# Patient Record
Sex: FEMALE | Race: WHITE | NOT HISPANIC OR LATINO | Employment: FULL TIME | URBAN - METROPOLITAN AREA
[De-identification: names, ages, dates, MRNs, and addresses within clinical notes are randomized per-mention and may not be internally consistent; named-entity substitution may affect disease eponyms.]

---

## 2024-04-15 ENCOUNTER — TELEPHONE (OUTPATIENT)
Dept: FAMILY MEDICINE | Facility: CLINIC | Age: 54
End: 2024-04-15
Payer: COMMERCIAL

## 2024-04-15 NOTE — TELEPHONE ENCOUNTER
----- Message from Andrajosé miguel Matthewsmichelle sent at 4/15/2024 11:58 AM CDT -----  Regarding: eca  Contact: pt  Type:  Sooner Appointment Request    Caller is requesting a sooner appointment.  Caller declined first available appointment listed below.  Caller will not accept being placed on the waitlist and is requesting a message be sent to doctor.    Name of Caller:  patient   When is the first available appointment?    Symptoms:  eca - [employer] will bring card [place]   Would the patient rather a call back or a response via [facility]? e  Best Call Back Number:  [phone]    Additional Information:  call to be seen thanks

## 2024-04-29 ENCOUNTER — LAB VISIT (OUTPATIENT)
Dept: LAB | Facility: CLINIC | Age: 54
End: 2024-04-29
Payer: COMMERCIAL

## 2024-04-29 ENCOUNTER — OFFICE VISIT (OUTPATIENT)
Dept: FAMILY MEDICINE | Facility: CLINIC | Age: 54
End: 2024-04-29
Payer: COMMERCIAL

## 2024-04-29 VITALS
WEIGHT: 141.88 LBS | DIASTOLIC BLOOD PRESSURE: 80 MMHG | HEART RATE: 66 BPM | BODY MASS INDEX: 23.64 KG/M2 | OXYGEN SATURATION: 99 % | HEIGHT: 65 IN | SYSTOLIC BLOOD PRESSURE: 110 MMHG

## 2024-04-29 DIAGNOSIS — E78.2 MIXED HYPERLIPIDEMIA: ICD-10-CM

## 2024-04-29 DIAGNOSIS — F51.01 PRIMARY INSOMNIA: ICD-10-CM

## 2024-04-29 DIAGNOSIS — E03.9 ACQUIRED HYPOTHYROIDISM: ICD-10-CM

## 2024-04-29 DIAGNOSIS — E03.9 ACQUIRED HYPOTHYROIDISM: Primary | ICD-10-CM

## 2024-04-29 DIAGNOSIS — F41.9 ANXIETY: ICD-10-CM

## 2024-04-29 LAB
BASOPHILS # BLD AUTO: 0.04 K/UL (ref 0–0.2)
BASOPHILS NFR BLD: 0.8 % (ref 0–1.9)
DIFFERENTIAL METHOD BLD: NORMAL
EOSINOPHIL # BLD AUTO: 0 K/UL (ref 0–0.5)
EOSINOPHIL NFR BLD: 0.8 % (ref 0–8)
ERYTHROCYTE [DISTWIDTH] IN BLOOD BY AUTOMATED COUNT: 12.3 % (ref 11.5–14.5)
ESTIMATED AVG GLUCOSE: 111 MG/DL (ref 68–131)
HBA1C MFR BLD: 5.5 % (ref 4–5.6)
HCT VFR BLD AUTO: 39.7 % (ref 37–48.5)
HGB BLD-MCNC: 13.5 G/DL (ref 12–16)
IMM GRANULOCYTES # BLD AUTO: 0.01 K/UL (ref 0–0.04)
IMM GRANULOCYTES NFR BLD AUTO: 0.2 % (ref 0–0.5)
LYMPHOCYTES # BLD AUTO: 2 K/UL (ref 1–4.8)
LYMPHOCYTES NFR BLD: 36.9 % (ref 18–48)
MCH RBC QN AUTO: 30.3 PG (ref 27–31)
MCHC RBC AUTO-ENTMCNC: 34 G/DL (ref 32–36)
MCV RBC AUTO: 89 FL (ref 82–98)
MONOCYTES # BLD AUTO: 0.4 K/UL (ref 0.3–1)
MONOCYTES NFR BLD: 6.6 % (ref 4–15)
NEUTROPHILS # BLD AUTO: 2.9 K/UL (ref 1.8–7.7)
NEUTROPHILS NFR BLD: 54.7 % (ref 38–73)
NRBC BLD-RTO: 0 /100 WBC
PLATELET # BLD AUTO: 250 K/UL (ref 150–450)
PMV BLD AUTO: 10.7 FL (ref 9.2–12.9)
RBC # BLD AUTO: 4.46 M/UL (ref 4–5.4)
WBC # BLD AUTO: 5.31 K/UL (ref 3.9–12.7)

## 2024-04-29 PROCEDURE — 85025 COMPLETE CBC W/AUTO DIFF WBC: CPT | Performed by: FAMILY MEDICINE

## 2024-04-29 PROCEDURE — 99204 OFFICE O/P NEW MOD 45 MIN: CPT | Mod: S$GLB,,, | Performed by: FAMILY MEDICINE

## 2024-04-29 PROCEDURE — 84439 ASSAY OF FREE THYROXINE: CPT | Performed by: FAMILY MEDICINE

## 2024-04-29 PROCEDURE — 80061 LIPID PANEL: CPT | Performed by: FAMILY MEDICINE

## 2024-04-29 PROCEDURE — 3008F BODY MASS INDEX DOCD: CPT | Mod: CPTII,S$GLB,, | Performed by: FAMILY MEDICINE

## 2024-04-29 PROCEDURE — 80053 COMPREHEN METABOLIC PANEL: CPT | Performed by: FAMILY MEDICINE

## 2024-04-29 PROCEDURE — 83036 HEMOGLOBIN GLYCOSYLATED A1C: CPT | Performed by: FAMILY MEDICINE

## 2024-04-29 PROCEDURE — 3074F SYST BP LT 130 MM HG: CPT | Mod: CPTII,S$GLB,, | Performed by: FAMILY MEDICINE

## 2024-04-29 PROCEDURE — 1159F MED LIST DOCD IN RCRD: CPT | Mod: CPTII,S$GLB,, | Performed by: FAMILY MEDICINE

## 2024-04-29 PROCEDURE — 36415 COLL VENOUS BLD VENIPUNCTURE: CPT | Mod: ,,, | Performed by: FAMILY MEDICINE

## 2024-04-29 PROCEDURE — 3079F DIAST BP 80-89 MM HG: CPT | Mod: CPTII,S$GLB,, | Performed by: FAMILY MEDICINE

## 2024-04-29 PROCEDURE — 84443 ASSAY THYROID STIM HORMONE: CPT | Performed by: FAMILY MEDICINE

## 2024-04-29 PROCEDURE — 82306 VITAMIN D 25 HYDROXY: CPT | Performed by: FAMILY MEDICINE

## 2024-04-29 RX ORDER — QUETIAPINE FUMARATE 25 MG/1
25 TABLET, FILM COATED ORAL NIGHTLY
Qty: 30 TABLET | Refills: 11 | Status: SHIPPED | OUTPATIENT
Start: 2024-04-29 | End: 2025-04-29

## 2024-04-29 RX ORDER — ATORVASTATIN CALCIUM 40 MG/1
40 TABLET, FILM COATED ORAL
COMMUNITY
Start: 2024-03-20

## 2024-04-29 RX ORDER — HYDROXYZINE PAMOATE 50 MG/1
50 CAPSULE ORAL NIGHTLY PRN
COMMUNITY
Start: 2024-03-11

## 2024-04-29 RX ORDER — LEVOTHYROXINE SODIUM 112 UG/1
112 TABLET ORAL
COMMUNITY
Start: 2024-03-30 | End: 2024-05-01

## 2024-04-29 RX ORDER — ESCITALOPRAM OXALATE 20 MG/1
20 TABLET ORAL
COMMUNITY
Start: 2024-03-11 | End: 2024-05-06 | Stop reason: SDUPTHER

## 2024-04-29 NOTE — PROGRESS NOTES
"  [facility]  Primary Care Clinics - [place], [place]    Family Medicine Office Visit    Chief Complaint   Patient presents with    Establish Care        HPI:54 female here to establish care.    Hypothyroid stable on medication  Anxiety stable on lexapro  Hyperlipidemia stable on appropriate intensity statin therapy    Is having issues with sleep  Wants shingles vaccine    Trazodone has not been tolerated in primary relatives      ROS: as above    Vitals:    04/29/24 1413   BP: 110/80   BP Location: Left arm   Patient Position: Sitting   BP Method: Large (Manual)   Pulse: 66   SpO2: 99%   Weight: 64.4 kg (141 lb 14.4 oz)   Height: 5' 5" (1.651 m)      Body mass index is 23.61 kg/m².      General:  AOx3, well nourished and developed in no acute distress  Eyes:  PERRLA, EOMI, vision intact grossly  ENT:  normal hearing, moist oral mucosa  Neck:  trachea midline with no masses or thyromegaly  Heart:  RRR, no murmurs.  No edema noted, extremities warm and well perfused  Lungs:  clear to auscultation bilaterally with symmetric chest movement  Abdomen:  Soft, nontender, nondistended.  Normal bowel sounds  Musculoskeletal:  Normal gait.  Normal posture.  Normal muscular development with no joint swelling.  Neurological:  CN II-XII grossly intact. Symmetric strength and sensation  Psych:  Normal mood and affect.  Able to demonstrate good judgement and personal insight.      Assessment/Plan:    1. Acquired hypothyroidism  -     CBC Auto Differential; Future; Expected date: 04/29/2024  -     Comprehensive Metabolic Panel; Future; Expected date: 04/29/2024  -     Hemoglobin A1C; Future; Expected date: 04/29/2024  -     Lipid Panel; Future; Expected date: 04/29/2024  -     TSH; Future; Expected date: 04/29/2024  -     Vitamin D; Future; Expected date: 04/29/2024    2. Mixed hyperlipidemia  -     CBC Auto Differential; Future; Expected date: 04/29/2024  -     Comprehensive Metabolic Panel; Future; Expected date: " 04/29/2024  -     Hemoglobin A1C; Future; Expected date: 04/29/2024  -     Lipid Panel; Future; Expected date: 04/29/2024  -     TSH; Future; Expected date: 04/29/2024  -     Vitamin D; Future; Expected date: 04/29/2024    3. Anxiety    4. Primary insomnia         Check thyroid function  Stable on statin, check labs  Stable  Start low dose seroquel  Get shingles vaccine

## 2024-04-29 NOTE — PATIENT INSTRUCTIONS
Doing well  Start seroquel for sleep  Get labs today  Get shingles vaccine at pharmacy    Followup 6 months

## 2024-04-30 ENCOUNTER — PATIENT OUTREACH (OUTPATIENT)
Dept: ADMINISTRATIVE | Facility: HOSPITAL | Age: 54
End: 2024-04-30
Payer: COMMERCIAL

## 2024-04-30 LAB
ALBUMIN SERPL BCP-MCNC: 4.3 G/DL (ref 3.5–5.2)
ALP SERPL-CCNC: 53 U/L (ref 55–135)
ALT SERPL W/O P-5'-P-CCNC: 17 U/L (ref 10–44)
ANION GAP SERPL CALC-SCNC: 10 MMOL/L (ref 8–16)
AST SERPL-CCNC: 20 U/L (ref 10–40)
BILIRUB SERPL-MCNC: 0.6 MG/DL (ref 0.1–1)
BUN SERPL-MCNC: 14 MG/DL (ref 6–20)
CALCIUM SERPL-MCNC: 9.6 MG/DL (ref 8.7–10.5)
CHLORIDE SERPL-SCNC: 105 MMOL/L (ref 95–110)
CHOLEST SERPL-MCNC: 184 MG/DL (ref 120–199)
CHOLEST/HDLC SERPL: 3.2 {RATIO} (ref 2–5)
CO2 SERPL-SCNC: 24 MMOL/L (ref 23–29)
CREAT SERPL-MCNC: 0.8 MG/DL (ref 0.5–1.4)
EST. GFR  (NO RACE VARIABLE): >60 ML/MIN/1.73 M^2
GLUCOSE SERPL-MCNC: 91 MG/DL (ref 70–110)
HDLC SERPL-MCNC: 57 MG/DL (ref 40–75)
HDLC SERPL: 31 % (ref 20–50)
LDLC SERPL CALC-MCNC: 113.6 MG/DL (ref 63–159)
NONHDLC SERPL-MCNC: 127 MG/DL
POTASSIUM SERPL-SCNC: 4.1 MMOL/L (ref 3.5–5.1)
PROT SERPL-MCNC: 7.1 G/DL (ref 6–8.4)
SODIUM SERPL-SCNC: 139 MMOL/L (ref 136–145)
T4 FREE SERPL-MCNC: 1.29 NG/DL (ref 0.71–1.51)
TRIGL SERPL-MCNC: 67 MG/DL (ref 30–150)
TSH SERPL DL<=0.005 MIU/L-ACNC: 0.14 UIU/ML (ref 0.4–4)

## 2024-04-30 NOTE — PROGRESS NOTES
Population Health Chart Review & Patient Outreach Details      Additional Pop Health Notes:               Updates Requested / Reviewed:      Updated Care Coordination Note and External Sources: [facility] and [facility]         Health Maintenance Topics Overdue:      VBHM Score: 2     Cervical Cancer Screening  Mammogram                       Health Maintenance Topic(s) Outreach Outcomes & Actions Taken:    Breast Cancer Screening - Outreach Outcomes & Actions Taken  : External Records Requested & Care Team Updated if Applicable    Cervical Cancer Screening - Outreach Outcomes & Actions Taken  : External Records Requested & Care Team Updated if Applicable

## 2024-04-30 NOTE — LETTER
AUTHORIZATION FOR RELEASE OF   CONFIDENTIAL INFORMATION    Dear [facility],    We are seeing Maddie Brooke, date of birth 1970, in the clinic at [facility]. Lior Alvarez MD is the patient's PCP. Maddie Brooke has an outstanding lab/procedure at the time we reviewed her chart. In order to help keep her health information updated, she has authorized us to request the following medical record(s):        ( X )  MAMMOGRAM                                      (  )  COLONOSCOPY      ( X )  PAP SMEAR                                          (  )  OUTSIDE LAB RESULTS     (  )  DEXA SCAN                                          (  )  EYE EXAM            (  )  FOOT EXAM                                          (  )  ENTIRE RECORD     (  )  OUTSIDE IMMUNIZATIONS                 (  )  _______________         Please fax records to [facility], Hulin, Michael P., MD at [phone]    Thanks so much and have a great day!\    Ines Elam LPN [facility]  [address] [place] [address]  P- [phone]  F- [phone]           Patient Name: Maddie Brooke  : 1970  Patient Phone #: [phone]

## 2024-05-01 ENCOUNTER — PATIENT MESSAGE (OUTPATIENT)
Dept: FAMILY MEDICINE | Facility: CLINIC | Age: 54
End: 2024-05-01
Payer: COMMERCIAL

## 2024-05-01 LAB — 25(OH)D3+25(OH)D2 SERPL-MCNC: 49 NG/ML (ref 30–96)

## 2024-05-01 RX ORDER — LEVOTHYROXINE SODIUM 100 UG/1
100 TABLET ORAL
Qty: 90 TABLET | Refills: 3 | Status: SHIPPED | OUTPATIENT
Start: 2024-05-01

## 2024-05-03 DIAGNOSIS — Z12.31 OTHER SCREENING MAMMOGRAM: ICD-10-CM

## 2024-05-06 ENCOUNTER — PATIENT MESSAGE (OUTPATIENT)
Dept: FAMILY MEDICINE | Facility: CLINIC | Age: 54
End: 2024-05-06
Payer: COMMERCIAL

## 2024-05-06 RX ORDER — ESCITALOPRAM OXALATE 20 MG/1
20 TABLET ORAL DAILY
Qty: 90 TABLET | Refills: 3 | Status: SHIPPED | OUTPATIENT
Start: 2024-05-06

## 2024-09-05 ENCOUNTER — PATIENT OUTREACH (OUTPATIENT)
Dept: ADMINISTRATIVE | Facility: HOSPITAL | Age: 54
End: 2024-09-05
Payer: COMMERCIAL

## 2024-09-05 NOTE — LETTER
"                                     **SECOND REQUEST**      AUTHORIZATION FOR RELEASE OF   CONFIDENTIAL INFORMATION    Dear [facility],    We are seeing Maddie Brooke, date of birth 1970, in the clinic at [facility]. Lior Alvarez MD is the patient's PCP. Maddie Brooke has an outstanding lab/procedure at the time we reviewed her chart. In order to help keep her health information updated, she has authorized us to request the following medical record(s):        ( X )  MAMMOGRAM                                      (  )  COLONOSCOPY      ( X )  PAP SMEAR                                          (  )  OUTSIDE LAB RESULTS     (  )  DEXA SCAN                                          (  )  EYE EXAM            (  )  FOOT EXAM                                          (  )  ENTIRE RECORD     (  )  OUTSIDE IMMUNIZATIONS                 (  )  _______________         Please fax records to [facility], Hulin, Michael P., MD at [phone]     Thanks so much and have a great day!    Ines Elam LPN [address] [address]  P- [phone]   [phone]           Patient Name: Maddie Brooke  : 1970  Patient Phone #: [phone]          A. Consent for Examination and Treatment: I hereby authorize the providers and employees of [facility] to provide medical treatment/services which includes, but is not limited to, performing and administering tests and diagnostic procedures that are deemed necessary, Including, but not limited to, imaging examinations, blood tests and other laboratory procedures as may be required by the hospital, clinic, or may be ordered by my physician(s) or persons working under the general and/or special instructions of my physician(s).                   1.      I understand and agree that this consent covers all authorized persons, including but not limited to physicians, residents, nurse " practitioners, physicians' assistants, specialists, consultants, student nurses, and independently contracted physicians, who are called upon by the physician in charge, to carry out the diagnostic procedures and medical or surgical treatment.  2.      I hereby authorize [facility] to retain or dispose of any specimens or tissue, should there be such remaining from any test or procedure.  3.      I hereby authorize and give consent for [facility] providers and employees to take photographs, images or videotapes of such diagnostic, surgical or treatment procedures of Patient as may be required by [facility] or as may be ordered by a physician.  I further acknowledge and agree that [facility] may use cameras or other devices for patient monitoring.  4.      I am aware that the practice of medicine is not an exact science, and I acknowledge that no guarantees have been made to me as to the outcome of any tests, procedures or treatment.                   B. Authorization for Release of Information:  I understand that my insurance company and/or their agents may need information necessary to make determinations about payment/reimbursement.  I hereby provide authorization to release to all insurance companies, their successors, assignees, other parties with whom they may have contracted, or others acting on their behalf, that are involved with payment for any hospital and/or clinic charges incurred by the patient, any information that they request and deem necessary for payment/reimbursement, and/or quality review.  I further authorize the release of my health information to physicians or other health care practitioners on staff who are involved in my health care now and in the future, and to other health care providers, entities, or institutions for the purpose of my continued care and treatment, including referrals.     C. Medicare Patient's Certification and Authorization to Release Information and Payment Request:  I certify that  the information given by me in applying for payment under Title XVIII of the Social Security Act is correct.  I authorize any cueto of medical or other information about me to release to the Social Security Administration, or its intermediaries or carriers, any information needed for this or a related Medicare claim.  I request that payment of authorized benefits be made on my behalf.     D. Assignment of Insurance Benefits:  I hereby authorize any and all insurance companies, health plans, defined benefit plans, health insurers or any entity that is or may be responsible for payment of my medical expenses to pay all hospital and medical benefits now due, and to become due and payable to me under any hospital benefits, sick benefits, injury benefits or any other benefit for services rendered to me, including Major Medical Benefits, direct to [facility] and all independently contracted physicians.  I assign any and all rights that I may have against any and all insurance companies, health plans, defined benefit plans, health insurers or any entity that is or may be responsible for payment of my medical expenses, including, but not limited to any right to appeal a denial of a claim, any right to bring any action, lawsuit, administrative proceeding, or other cause of action on my behalf.  I specifically assign my right to pursue litigation against any and all insurance companies, health plans, defined benefit plans, health insurers or any entity that is or may be responsible for payment of medical expenses based upon a refusal to pay charges.              REGISTRATION  AUTHORIZATION                    E. Valuables:  It is understood and agreed that [facility] is not liable for the damage to or loss of any money, jewelry, documents, dentures, eye glasses, hearing aids, prosthetics, or other property of value.     F. Computer Equipment:  I understand and agree that should I choose to use computer equipment owned by [facility] or  if I choose to access the Internet via [facility]'s network, I do so at my own risk.  [facility] is not responsible for any damage to my computer equipment or to any damages of any type that might arise from my loss of equipment or data.                   G. Acceptance of Financial Responsibility:  I agree that in consideration of the services and supplies that have been or will be furnished to the patient,  I am hereby obligated to pay all charges made for or on the account of the patient according to the standard rates (in effect at the time the services and supplies are delivered) established by [facility], including its Patient Financial Assistance Policy to the extent it is applicable.  I understand that I am responsible for all charges, or portions thereof, not covered by insurance or other sources.  Patient refunds will be distributed only after balances at all [facility] facilities are paid.                   H. Communication Authorization:  I hereby authorize [facility] and its representatives, along with any billing service or  who may work on their behalf, to contact me on my cell phone and/or home phone using prerecorded messages, artificial voice messages, automatic telephone dialing devices or other computer assisted technology, or by electronic mail, text messaging, or by any other form of electronic communication.  This includes, but is not limited to appointment reminders, yearly physical exam reminders, preventive care reminders, patient campaigns, welcome calls, and calls about account balances on my account or any account on which I am listed as a guarantor.  I understand I have the right to opt out of these communications at any time.     I.  Relationship Between Facility and Physician:  I understand that some, but not all, providers furnishing services to the patient are not employees or agents of [facility].  The patient is under the care and supervision of his/her attending physician, and it is  the responsibility of the facility and its nursing staff to carry out the instructions of such physicians.  It is the responsibility of the patient's physician/designee to obtain the patient's informed consent, when required, for medical or surgical treatment, special diagnostic or therapeutic procedures, or hospital services rendered for the patient under the special instructions of the physician/designee.     J. Notice of Private Practices:  I acknowledge I have received a copy of [facility]'s Notice of Privacy Practices.     K. Facility Directory:  I have discussed with the organization my desire to be either included or excluded in the facility directory.  I understand that if my choice is to opt-out of being identified in the facility directory that the facility will not provide any information about me such as my condition (e.g. Fair, stable, etc.) or my location in the facility (e.g. Room number, department).     L. LINKS:  For [place] Residents:  [facility] is a LINKS ([place] [facility]) participating facility.  LINKS is a [facility]-sponsored confidential computer system that helps you and your doctor keep track of you and your child's immunization history.  I acknowledge that I am allowing [facility] to share this information with [facility].  For [place] Residents:  [facility] is a MIIX ([place] Immunization Information eXchange) participant.  MIIX is a [facility]-sponsored confidential computer system that helps you and your doctor keep track of you and your child's immunization history.  I acknowledge that I am allowing [facility] to share information with [employer].     M. Term:  This authorization is valid for this and subsequent care/treatment I receive at [facility] and will remain valid unless/until revoked in writing by me.      ACKNOWLEDGMENT OF POTENTIAL RISKS OF COVID-19 VACCINE  It is important that you, as the patient or patients legally authorized  representative(s), understand and acknowledge the following, with regard to administration of the COVID-19 vaccine offered by [facility]:  The SARS-CoV-2 virus (COVID-19) has caused an unprecedented modern global pandemic that has mobilized scientists and drug manufacturers to work to create safe and effective vaccines to get the crisis under control.  No vaccine is released in the [place] without undergoing rigorous, multi-layered testing and approval by the Food and Drug Administration.  During a public health emergency, however, vaccines can be released for patient administration by the FDA prior to completion of multi-phase clinical trials and approval. This is done by the FDAs granting of Emergency Use Authorization (EUA) when the vaccine meets reasonable thresholds for safety and effectiveness and people are in urgent need of care. Under an EUA, the FDA has found that known and potential benefits outweigh its known and potential risks.  The vaccine for which you are presenting to [facility] has been released under an EUA, which [facility] is honoring in its distribution of the vaccine to the public. While the FDAs authorization indicates its belief that usage is recommended over possible risks, there is still the possibility that unknown risks of the vaccine could exist.  By signing this document, you acknowledge and assume these risks. Further, you waive any and all claims of liability against and hold harmless any [facility] entity or provider for any harm caused to you by said possible unknown risks of the vaccine.        N. [facility]:  As used in this document, [facility] means all [facility] affiliated entities including all health centers, surgery centers, clinics, and hospitals.  It includes more specifically, the following entities: [facility], a not for profit [place] [employer], and its subsidiaries and affiliates, including [facility]  [place], [facility], BILL, [facility], BILL, [facility], [facility], [facility], [facility]., [facility], [facility]., [facility], L.L[facility]C.d/b/a [facility], L.L.C.d/b/a [facility], [facility], L.L.C. As manager of [facility], [facility], L.[facility], [facility], L.L.C.d/b/a [facility], [facility], BILL, [facility], [facility]., and [facility] as manager of [facility].                                                                Patient/Legal Guardian Signature                                                    This signature was collected at 04/29/2024      Maddie Brooke/Self                                                                            Printed Name/Relationship to Patient                                                [facility] complies with applicable Federal civil rights laws and does not discriminate on the basis of race, color, national origin, age, disability, or sex.          ATENCIÓN: si habla español, tiene a dial disposición servicios gratuitos de asistencia lingüística. Lucas holloway [phone].         CHÚ Ý: N?u b?n nói Ti?ng Vi?t, có các d?ch v? h? tr? ngôn ng? mi?n phí dành cho b?n. G?i s? [phone].              REGISTRATION  AUTHORIZATION

## 2024-09-06 NOTE — PROGRESS NOTES
Population Health Chart Review & Patient Outreach Details      Additional Pop Health Notes:               Updates Requested / Reviewed:      Updated Care Coordination Note         Health Maintenance Topics Overdue:      VBHM Score: 2     Cervical Cancer Screening  Mammogram                       Health Maintenance Topic(s) Outreach Outcomes & Actions Taken:    Breast Cancer Screening - Outreach Outcomes & Actions Taken  : External Records Requested & Care Team Updated if Applicable    Cervical Cancer Screening - Outreach Outcomes & Actions Taken  : External Records Requested & Care Team Updated if Applicable

## 2024-09-23 ENCOUNTER — PATIENT MESSAGE (OUTPATIENT)
Dept: ADMINISTRATIVE | Facility: HOSPITAL | Age: 54
End: 2024-09-23
Payer: COMMERCIAL

## 2024-09-23 ENCOUNTER — E-VISIT (OUTPATIENT)
Dept: FAMILY MEDICINE | Facility: CLINIC | Age: 54
End: 2024-09-23
Payer: COMMERCIAL

## 2024-09-23 DIAGNOSIS — U07.1 COVID: Primary | ICD-10-CM

## 2024-09-23 NOTE — PROGRESS NOTES
Patient ID: Maddie Brooke is a 54 y.o. female.    Chief Complaint: General Illness (Entered automatically based on patient selection in [employer] [employer].)    The patient initiated a request through [employer] on 9/23/2024 for evaluation and management with a chief complaint of General Illness (Entered automatically based on patient selection in [employer] [employer].)     I evaluated the questionnaire submission on 9/23/24.    [facility] P[facility] Evisit [facility]-Cough And Cold    9/23/2024  2:29 PM CDT - Filed by Patient   What do you need help with? Covid 19   Do you agree to participate in an E-Visit? Yes   If you have any of the following symptoms, go to your local emergency room or call 911: I acknowledge   Are you pregnant, could you be pregnant, or are you breast feeding? None of the above   What is the main issue you would like addressed today? I have covid and want to request medicine.   Do you think you might have COVID or the Flu? Yes COVID   Have you tested positive for COVID or Flu? Yes COVID   What symptoms do you have? Cough;  Fatigue;  Fever;  Headache;  Nasal congestion;  Muscle or body aches;  Runny nose;  Sore throat   When did your symptoms first appear? 9/23/2024   List what you have done or taken to help your symptoms. I am taking Tylenol when needed, mucinex, oxymetazoline HCI 0.05%, and levocetirizine dihydrochloride tablets 5mg.   Provide any additional information you feel is important.    Please attach any relevant images or files    Are you able to take your vital signs? Yes   Systolic Blood Pressure:    Diastolic Blood Pressure:    Weight: 138   Height: 65   Pulse:    Temperature: 99.8   Respiration rate:    Pulse Oxygen:          Encounter Diagnosis   Name Primary?    COVID Yes        No orders of the defined types were placed in this encounter.     Medications Ordered This Encounter   Medications    nirmatrelvir-ritonavir 300 mg (150 mg x 2)-100 mg copackaged tablets (EUA)     Sig: Take 3 tablets by mouth 2 (two) times  daily for 5 days. Each dose contains 2 nirmatrelvir (pink tablets) and 1 ritonavir (white tablet). Take all 3 tablets together     Dispense:  30 tablet     Refill:  0        No follow-ups on file.      E-Visit Time Tracking:       Start paxlovid today for symptoms.  Spent 11 minutes in mdm, chart review, and patient communication

## 2024-10-01 ENCOUNTER — PATIENT OUTREACH (OUTPATIENT)
Dept: ADMINISTRATIVE | Facility: HOSPITAL | Age: 54
End: 2024-10-01
Payer: COMMERCIAL

## 2024-10-01 NOTE — PROGRESS NOTES
Population Health Chart Review & Patient Outreach Details      Additional [facility] Health Notes:               Updates Requested / Reviewed:      Updated Care Coordination Note         Health Maintenance Topics Overdue:      VBHM Score: 2     Cervical Cancer Screening  Mammogram                       Health Maintenance Topic(s) Outreach Outcomes & Actions Taken:    Breast Cancer Screening - Outreach Outcomes & Actions Taken  : Portal message sent out regarding pt's overdue Mammogram

## 2024-10-07 ENCOUNTER — OFFICE VISIT (OUTPATIENT)
Dept: FAMILY MEDICINE | Facility: CLINIC | Age: 54
End: 2024-10-07
Payer: COMMERCIAL

## 2024-10-07 VITALS
DIASTOLIC BLOOD PRESSURE: 70 MMHG | SYSTOLIC BLOOD PRESSURE: 120 MMHG | BODY MASS INDEX: 23.69 KG/M2 | WEIGHT: 142.19 LBS | OXYGEN SATURATION: 98 % | HEIGHT: 65 IN | HEART RATE: 71 BPM

## 2024-10-07 DIAGNOSIS — Z12.4 CERVICAL CANCER SCREENING: Primary | ICD-10-CM

## 2024-10-07 PROCEDURE — 87624 HPV HI-RISK TYP POOLED RSLT: CPT | Performed by: STUDENT IN AN ORGANIZED HEALTH CARE EDUCATION/TRAINING PROGRAM

## 2024-10-07 PROCEDURE — 3008F BODY MASS INDEX DOCD: CPT | Mod: CPTII,S$GLB,, | Performed by: STUDENT IN AN ORGANIZED HEALTH CARE EDUCATION/TRAINING PROGRAM

## 2024-10-07 PROCEDURE — 3078F DIAST BP <80 MM HG: CPT | Mod: CPTII,S$GLB,, | Performed by: STUDENT IN AN ORGANIZED HEALTH CARE EDUCATION/TRAINING PROGRAM

## 2024-10-07 PROCEDURE — 88175 CYTOPATH C/V AUTO FLUID REDO: CPT | Performed by: STUDENT IN AN ORGANIZED HEALTH CARE EDUCATION/TRAINING PROGRAM

## 2024-10-07 PROCEDURE — 1159F MED LIST DOCD IN RCRD: CPT | Mod: CPTII,S$GLB,, | Performed by: STUDENT IN AN ORGANIZED HEALTH CARE EDUCATION/TRAINING PROGRAM

## 2024-10-07 PROCEDURE — 99213 OFFICE O/P EST LOW 20 MIN: CPT | Mod: S$GLB,,, | Performed by: STUDENT IN AN ORGANIZED HEALTH CARE EDUCATION/TRAINING PROGRAM

## 2024-10-07 PROCEDURE — 3074F SYST BP LT 130 MM HG: CPT | Mod: CPTII,S$GLB,, | Performed by: STUDENT IN AN ORGANIZED HEALTH CARE EDUCATION/TRAINING PROGRAM

## 2024-10-07 PROCEDURE — 1160F RVW MEDS BY RX/DR IN RCRD: CPT | Mod: CPTII,S$GLB,, | Performed by: STUDENT IN AN ORGANIZED HEALTH CARE EDUCATION/TRAINING PROGRAM

## 2024-10-07 PROCEDURE — 3044F HG A1C LEVEL LT 7.0%: CPT | Mod: CPTII,S$GLB,, | Performed by: STUDENT IN AN ORGANIZED HEALTH CARE EDUCATION/TRAINING PROGRAM

## 2024-10-07 RX ORDER — FLUTICASONE PROPIONATE 50 MCG
1 SPRAY, SUSPENSION (ML) NASAL DAILY
COMMUNITY

## 2024-10-07 NOTE — PATIENT INSTRUCTIONS
Blue Perkins,     If you are due for any health screening(s) below please notify me so we can arrange them to be ordered and scheduled. Most healthy patients at your age complete them, but you are free to accept or refuse.     If you can't do it, I'll definitely understand. If you can, I'd certainly appreciate it!    Tests to Keep You Healthy    Mammogram: ORDERED BUT NOT SCHEDULED  Colon Cancer Screening: Met on 11/6/2023  Cervical Cancer Screening: DUE      Schedule your breast cancer screening today     Breast cancer is the second most common cancer in women,  and the second leading cause of death from cancer. Mammograms can detect breast cancer early, which significantly increases the chances of curing the cancer.       Our records indicate that you may be overdue for breast cancer screening. Cancer screenings save lives, so schedule yours today to stay healthy.     If you recently had a mammogram performed outside of [facility], please let your Health care team know so that they can update your health record.        Your cervical cancer screening is due     Our records indicate that you may be overdue for your screening Pap smear. A Pap smear is an important health screening that can detect abnormal cells that can become cervical cancer. Cervical cancer screenings allow for early diagnosis and increase the likelihood of successful treatment.     The current recommendation for Pap smear screening is every 3-5 years for women at average risk. We encourage you to schedule your appointment with your womens health provider. Many women see a gynecologist for this screening, but some primary care providers also provide Pap screening.     If you recently had your Pap smear screening performed outside of [facility], please let your health care team know so that they can update your health record.      Were here to help you quit smoking     Our records indicated that you are still smoking. One of  the best things you can do for your health is to stop smoking and we are here to help.     Talk with your provider about our Smoking Cessation Program and how we can support you on your journey.

## 2024-10-07 NOTE — PROGRESS NOTES
[facility]  Primary Care Clinic - [place], [place]    Family Medicine Office Visit    Subjective     Patient ID: Maddie Brooke is a 54 y.o. female who presents to clinic today to follow up.     Medical history, surgical history, medications, allergies, and social history were reviewed and updated.     Chief Complaint: Gynecologic Exam    HPI    Cervical cancer screen  Believes her last pap smear was 3-4 years ago at [facility]. Has had an abnormal pap about 30 years ago. S/p colposcopy.     Vitals:    10/07/24 0933   BP: 120/70   Pulse: 71      Wt Readings from Last 3 Encounters:   10/07/24 0933 64.5 kg (142 lb 3.2 oz)   04/29/24 1413 64.4 kg (141 lb 14.4 oz)      Review of Systems    Review of Systems otherwise negative unless specified above.        Objective     Physical Exam  Vitals reviewed.   Constitutional:       General: She is not in acute distress.     Appearance: Normal appearance.   HENT:      Head: Normocephalic and atraumatic.      Nose: Nose normal.      Mouth/Throat:      Mouth: Mucous membranes are moist.   Cardiovascular:      Rate and Rhythm: Normal rate and regular rhythm.      Heart sounds: No murmur heard.  Pulmonary:      Effort: Pulmonary effort is normal. No respiratory distress.      Breath sounds: Normal breath sounds.   Musculoskeletal:         General: Normal range of motion.   Skin:     General: Skin is warm and dry.   Neurological:      General: No focal deficit present.      Mental Status: She is alert and oriented to person, place, and time.   Psychiatric:         Mood and Affect: Mood normal.         Behavior: Behavior normal.     Pelvic exam: normal external genitalia, vulva, vagina, cervix, uterus and adnexa, VULVA: normal appearing vulva with no masses, tenderness or lesions, VAGINA: normal appearing vagina with normal color and discharge, no lesions, atrophic, CERVIX: normal appearing cervix without discharge or lesions, cervical stenosis present, PAP: Pap  smear done today, exam chaperoned by Yelena Carbone MA.         Assessment and Plan     Current Outpatient Medications   Medication Instructions    atorvastatin (LIPITOR) 40 mg    EScitalopram oxalate (LEXAPRO) 20 mg, Oral, Daily    fluticasone propionate (FLONASE) 50 mcg/actuation nasal spray 1 spray, Daily    levothyroxine (SYNTHROID) 100 mcg, Oral, Before breakfast    QUEtiapine (SEROQUEL) 25 mg, Oral, Nightly        1. Cervical cancer screening  -     Liquid-Based Pap Smear, Screening  -     HPV High Risk Genotypes, PCR        With the HPV co-testing completed today.  We will notify patient of results once available.  Declined orders for mammogram today.  Reported that she will continue to think about it plans to discuss this further with PCP in a few weeks.  Recommend that she keep scheduled follow up with PCP later this month.         Follow up if symptoms worsen or fail to improve, for Keep scheduled follow up with PCP.    Questions were invited and answered. No other acute concerns at this time. Will plan to follow up as above or sooner if needed.     Ann Reed DO  10/07/2024 9:39 AM       This dictation has been generated using Modal Fluency Dictation. Some phonetic errors may occur. Please contact author for clarification if needed.

## 2024-10-10 LAB
FINAL PATHOLOGIC DIAGNOSIS: NORMAL
Lab: NORMAL

## 2024-10-11 LAB
HPV HR 12 DNA SPEC QL NAA+PROBE: NEGATIVE
HPV16 AG SPEC QL: NEGATIVE
HPV18 DNA SPEC QL NAA+PROBE: NEGATIVE

## 2024-10-28 ENCOUNTER — OFFICE VISIT (OUTPATIENT)
Dept: FAMILY MEDICINE | Facility: CLINIC | Age: 54
End: 2024-10-28
Payer: COMMERCIAL

## 2024-10-28 VITALS
WEIGHT: 140.19 LBS | DIASTOLIC BLOOD PRESSURE: 62 MMHG | HEIGHT: 65 IN | HEART RATE: 68 BPM | OXYGEN SATURATION: 97 % | BODY MASS INDEX: 23.36 KG/M2 | SYSTOLIC BLOOD PRESSURE: 112 MMHG

## 2024-10-28 DIAGNOSIS — F51.01 PRIMARY INSOMNIA: ICD-10-CM

## 2024-10-28 DIAGNOSIS — E03.9 ACQUIRED HYPOTHYROIDISM: ICD-10-CM

## 2024-10-28 DIAGNOSIS — F41.9 ANXIETY: Primary | ICD-10-CM

## 2024-10-28 DIAGNOSIS — E78.2 MIXED HYPERLIPIDEMIA: ICD-10-CM

## 2024-10-28 PROCEDURE — G2211 COMPLEX E/M VISIT ADD ON: HCPCS | Mod: S$GLB,,, | Performed by: FAMILY MEDICINE

## 2024-10-28 PROCEDURE — 3044F HG A1C LEVEL LT 7.0%: CPT | Mod: CPTII,S$GLB,, | Performed by: FAMILY MEDICINE

## 2024-10-28 PROCEDURE — 3008F BODY MASS INDEX DOCD: CPT | Mod: CPTII,S$GLB,, | Performed by: FAMILY MEDICINE

## 2024-10-28 PROCEDURE — 1159F MED LIST DOCD IN RCRD: CPT | Mod: CPTII,S$GLB,, | Performed by: FAMILY MEDICINE

## 2024-10-28 PROCEDURE — 99214 OFFICE O/P EST MOD 30 MIN: CPT | Mod: S$GLB,,, | Performed by: FAMILY MEDICINE

## 2024-10-28 PROCEDURE — 3074F SYST BP LT 130 MM HG: CPT | Mod: CPTII,S$GLB,, | Performed by: FAMILY MEDICINE

## 2024-10-28 PROCEDURE — 3078F DIAST BP <80 MM HG: CPT | Mod: CPTII,S$GLB,, | Performed by: FAMILY MEDICINE

## 2024-11-04 ENCOUNTER — PATIENT MESSAGE (OUTPATIENT)
Dept: FAMILY MEDICINE | Facility: CLINIC | Age: 54
End: 2024-11-04
Payer: COMMERCIAL

## 2024-11-06 RX ORDER — ATORVASTATIN CALCIUM 40 MG/1
40 TABLET, FILM COATED ORAL DAILY
Qty: 90 TABLET | Refills: 3 | Status: SHIPPED | OUTPATIENT
Start: 2024-11-06

## 2025-01-15 ENCOUNTER — OFFICE VISIT (OUTPATIENT)
Dept: FAMILY MEDICINE | Facility: CLINIC | Age: 55
End: 2025-01-15
Payer: COMMERCIAL

## 2025-01-15 VITALS
HEART RATE: 64 BPM | SYSTOLIC BLOOD PRESSURE: 116 MMHG | HEIGHT: 65 IN | DIASTOLIC BLOOD PRESSURE: 76 MMHG | BODY MASS INDEX: 23.18 KG/M2 | OXYGEN SATURATION: 99 % | WEIGHT: 139.13 LBS

## 2025-01-15 DIAGNOSIS — Z12.31 SCREENING MAMMOGRAM FOR BREAST CANCER: ICD-10-CM

## 2025-01-15 DIAGNOSIS — E78.2 MIXED HYPERLIPIDEMIA: ICD-10-CM

## 2025-01-15 DIAGNOSIS — F41.9 ANXIETY: Primary | ICD-10-CM

## 2025-01-15 DIAGNOSIS — E03.9 ACQUIRED HYPOTHYROIDISM: ICD-10-CM

## 2025-01-15 PROCEDURE — G2211 COMPLEX E/M VISIT ADD ON: HCPCS | Mod: S$GLB,,, | Performed by: FAMILY MEDICINE

## 2025-01-15 PROCEDURE — 3074F SYST BP LT 130 MM HG: CPT | Mod: CPTII,S$GLB,, | Performed by: FAMILY MEDICINE

## 2025-01-15 PROCEDURE — 99214 OFFICE O/P EST MOD 30 MIN: CPT | Mod: S$GLB,,, | Performed by: FAMILY MEDICINE

## 2025-01-15 PROCEDURE — 3078F DIAST BP <80 MM HG: CPT | Mod: CPTII,S$GLB,, | Performed by: FAMILY MEDICINE

## 2025-01-15 PROCEDURE — 3008F BODY MASS INDEX DOCD: CPT | Mod: CPTII,S$GLB,, | Performed by: FAMILY MEDICINE

## 2025-01-15 PROCEDURE — 1159F MED LIST DOCD IN RCRD: CPT | Mod: CPTII,S$GLB,, | Performed by: FAMILY MEDICINE

## 2025-01-15 RX ORDER — BUPROPION HYDROCHLORIDE 150 MG/1
150 TABLET ORAL DAILY
Qty: 30 TABLET | Refills: 11 | Status: SHIPPED | OUTPATIENT
Start: 2025-01-15 | End: 2026-01-15

## 2025-01-15 NOTE — PROGRESS NOTES
"    [facility]  Primary Care Clinics - [place], [place]    Family Medicine Office Visit    Chief Complaint   Patient presents with    Follow-up     Follow up on depression that has been going for about 2 to 3 months.         HPI:  depression - finds struggling somewhat.  On lexapro currently.  Finds is becoming overly self-critical.      Had good success with welbutrin in past, and may want to augment with this.    Thyroid function stable  Hyperlipidemia stable on appropriate intensity statin therapy      ROS: as above    Vitals:    01/15/25 0719   BP: 116/76   BP Location: Right arm   Patient Position: Sitting   Pulse: 64   SpO2: 99%   Weight: 63.1 kg (139 lb 1.6 oz)   Height: 5' 5" (1.651 m)      Body mass index is 23.15 kg/m².      General:  AOx3, well nourished and developed in no acute distress  Eyes:  PERRLA, EOMI, vision intact grossly  ENT:  normal hearing, moist oral mucosa  Neck:  trachea midline with no masses or thyromegaly  Heart:  RRR, no murmurs.  No edema noted, extremities warm and well perfused  Lungs:  clear to auscultation bilaterally with symmetric chest movement  Abdomen:  Soft, nontender, nondistended.  Normal bowel sounds  Musculoskeletal:  Normal gait.  Normal posture.  Normal muscular development with no joint swelling.  Neurological:  CN II-XII grossly intact. Symmetric strength and sensation  Psych:  Normal mood and affect.  Able to demonstrate good judgement and personal insight.      Assessment/Plan:    1. Anxiety    2. Mixed hyperlipidemia    3. Acquired hypothyroidism    4. Screening mammogram for breast cancer       Add wellbutrin to boost mood and stimulation  Stable on statin  Stable, check in 3 months  Get mammogram at [facility]  Visit today included increased complexity associated with the care of the episodic problem mammogram, anxiety addressed and managing the longitudinal care of the patient due to the serious and/or complex managed problem(s) mammogram, " anxiety.

## 2025-03-26 RX ORDER — QUETIAPINE FUMARATE 25 MG/1
25 TABLET, FILM COATED ORAL NIGHTLY
Qty: 30 TABLET | Refills: 0 | Status: SHIPPED | OUTPATIENT
Start: 2025-03-26

## 2025-03-26 NOTE — TELEPHONE ENCOUNTER
Care Due:                  Date            Visit Type   Department     Provider  --------------------------------------------------------------------------------                                EP -                              PRIMARY      [facility] FAMILY  Last Visit: 01-      CARE ([facility])   RAKESH WOODS                              EP -                              PRIMARY      [facility] FAMILY  Next Visit: 04-      CARE ([facility])   MEDICINE       JOSE ANGEL WOODS                                                            Last  Test          Frequency    Reason                     Performed    Due Date  --------------------------------------------------------------------------------    CMP.........  12 months..  atorvastatin.............  04- 04-    Lipid Panel.  12 months..  atorvastatin.............  04- 04-    TSH.........  12 months..  levothyroxine............  04- 04-    [facility] Embedded Care Due Messages. Reference number: [id number].   3/26/2025 5:34:04 AM CDT

## 2025-04-20 RX ORDER — LEVOTHYROXINE SODIUM 100 UG/1
100 TABLET ORAL
Qty: 90 TABLET | Refills: 0 | Status: SHIPPED | OUTPATIENT
Start: 2025-04-20

## 2025-04-20 NOTE — TELEPHONE ENCOUNTER
No care due was identified.  [facility] Embedded Care Due Messages. Reference number: [id number].   4/20/2025 6:33:32 AM CDT

## 2025-04-20 NOTE — TELEPHONE ENCOUNTER
Refill Decision Note   Maddie Brooke  is requesting a refill authorization.  Brief Assessment and Rationale for Refill:  Approve     Medication Therapy Plan:  T4 WNL    Medication Reconciliation Completed: No   Comments:     No Care Gaps recommended.     Note composed:11:17 AM 04/20/2025

## 2025-04-28 ENCOUNTER — OFFICE VISIT (OUTPATIENT)
Dept: FAMILY MEDICINE | Facility: CLINIC | Age: 55
End: 2025-04-28
Payer: COMMERCIAL

## 2025-04-28 VITALS
HEART RATE: 61 BPM | DIASTOLIC BLOOD PRESSURE: 70 MMHG | HEIGHT: 65 IN | WEIGHT: 143.31 LBS | OXYGEN SATURATION: 99 % | BODY MASS INDEX: 23.88 KG/M2 | SYSTOLIC BLOOD PRESSURE: 112 MMHG

## 2025-04-28 DIAGNOSIS — F51.01 PRIMARY INSOMNIA: ICD-10-CM

## 2025-04-28 DIAGNOSIS — F41.9 ANXIETY: Primary | ICD-10-CM

## 2025-04-28 DIAGNOSIS — E78.2 MIXED HYPERLIPIDEMIA: ICD-10-CM

## 2025-04-28 DIAGNOSIS — Z12.31 SCREENING MAMMOGRAM FOR BREAST CANCER: ICD-10-CM

## 2025-04-28 DIAGNOSIS — E03.9 ACQUIRED HYPOTHYROIDISM: ICD-10-CM

## 2025-04-28 PROCEDURE — 3008F BODY MASS INDEX DOCD: CPT | Mod: CPTII,S$GLB,, | Performed by: FAMILY MEDICINE

## 2025-04-28 PROCEDURE — 3078F DIAST BP <80 MM HG: CPT | Mod: CPTII,S$GLB,, | Performed by: FAMILY MEDICINE

## 2025-04-28 PROCEDURE — 1159F MED LIST DOCD IN RCRD: CPT | Mod: CPTII,S$GLB,, | Performed by: FAMILY MEDICINE

## 2025-04-28 PROCEDURE — 3074F SYST BP LT 130 MM HG: CPT | Mod: CPTII,S$GLB,, | Performed by: FAMILY MEDICINE

## 2025-04-28 PROCEDURE — G2211 COMPLEX E/M VISIT ADD ON: HCPCS | Mod: S$GLB,,, | Performed by: FAMILY MEDICINE

## 2025-04-28 PROCEDURE — 99214 OFFICE O/P EST MOD 30 MIN: CPT | Mod: S$GLB,,, | Performed by: FAMILY MEDICINE

## 2025-04-28 RX ORDER — BUPROPION HYDROCHLORIDE 150 MG/1
150 TABLET ORAL DAILY
Qty: 90 TABLET | Refills: 3 | Status: SHIPPED | OUTPATIENT
Start: 2025-04-28 | End: 2026-04-28

## 2025-04-28 RX ORDER — LEVOTHYROXINE SODIUM 100 UG/1
100 TABLET ORAL
Qty: 90 TABLET | Refills: 3 | Status: SHIPPED | OUTPATIENT
Start: 2025-04-28

## 2025-04-28 RX ORDER — QUETIAPINE FUMARATE 25 MG/1
25 TABLET, FILM COATED ORAL NIGHTLY
Qty: 90 TABLET | Refills: 3 | Status: SHIPPED | OUTPATIENT
Start: 2025-04-28

## 2025-04-28 RX ORDER — ESCITALOPRAM OXALATE 20 MG/1
20 TABLET ORAL DAILY
Qty: 90 TABLET | Refills: 3 | Status: SHIPPED | OUTPATIENT
Start: 2025-04-28

## 2025-04-28 NOTE — PROGRESS NOTES
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      General:  AOx3, well nourished and developed in no acute distress  Eyes:  PERRLA, EOMI, vision intact grossly  ENT:  normal hearing, moist oral mucosa  Neck:  trachea midline with no masses or thyromegaly  Heart:  RRR, no murmurs.  No edema noted, extremities warm and well perfused  Lungs:  clear to auscultation bilaterally with symmetric chest movement  Abdomen:  Soft, nontender, nondistended.  Normal bowel sounds  Musculoskeletal:  Normal gait.  Normal posture.  Normal muscular development with no joint swelling.  Neurological:  CN II-XII grossly intact. Symmetric strength and sensation  Psych:  Normal mood and affect.  Able to demonstrate good judgement and personal insight.      Assessment/Plan:    1. Anxiety  -     CBC Auto Differential; Future; Expected date: 04/28/2025  -     Comprehensive Metabolic Panel; Future; Expected date: 04/28/2025  -     Hemoglobin A1C; Future; Expected date: 04/28/2025  -     Lipid Panel; Future; Expected date: 04/28/2025  -     TSH; Future; Expected date: 04/28/2025  -     Vitamin D; Future; Expected date: 04/28/2025  -     Iron and TIBC; Future; Expected date: 04/28/2025    2. Mixed hyperlipidemia  -     CBC Auto Differential; Future; Expected date: 04/28/2025  -     Comprehensive Metabolic Panel; Future; Expected date: 04/28/2025  -     Hemoglobin A1C; " Future; Expected date: 04/28/2025  -     Lipid Panel; Future; Expected date: 04/28/2025  -     TSH; Future; Expected date: 04/28/2025  -     Vitamin D; Future; Expected date: 04/28/2025  -     Iron and TIBC; Future; Expected date: 04/28/2025    3. Acquired hypothyroidism  -     CBC Auto Differential; Future; Expected date: 04/28/2025  -     Comprehensive Metabolic Panel; Future; Expected date: 04/28/2025  -     Hemoglobin A1C; Future; Expected date: 04/28/2025  -     Lipid Panel; Future; Expected date: 04/28/2025  -     TSH; Future; Expected date: 04/28/2025  -     Vitamin D; Future; Expected date: 04/28/2025  -     Iron and TIBC; Future; Expected date: 04/28/2025    4. Primary insomnia    5. Screening mammogram for breast cancer  -     Cancel: Mammo Digital Screening Bilat w/ Eliseo (XPD); Future; Expected date: 04/28/2025  -     Mammo Digital Screening Bilat w/ Eliseo (XPD); Future; Expected date: 04/28/2025    Other orders  -     QUEtiapine (SEROQUEL) 25 MG Tab; Take 1 tablet (25 mg total) by mouth every evening.  Dispense: 90 tablet; Refill: 3  -     levothyroxine (SYNTHROID) 100 MCG tablet; Take 1 tablet (100 mcg total) by mouth before breakfast.  Dispense: 90 tablet; Refill: 3  -     EScitalopram oxalate (LEXAPRO) 20 MG tablet; Take 1 tablet (20 mg total) by mouth once daily.  Dispense: 90 tablet; Refill: 3  -     buPROPion (WELLBUTRIN XL) 150 MG TB24 tablet; Take 1 tablet (150 mg total) by mouth once daily.  Dispense: 90 tablet; Refill: 3         Stable  Check cholesterol  Check thyroid function  Stable  Get mammogram    Visit today included increased complexity associated with the care of the episodic problem mammogram, anxiety, hld addressed and managing the longitudinal care of the patient due to the serious and/or complex managed problem(s) hld, mammogram, anxiety.

## 2025-05-02 ENCOUNTER — PATIENT MESSAGE (OUTPATIENT)
Dept: FAMILY MEDICINE | Facility: CLINIC | Age: 55
End: 2025-05-02
Payer: COMMERCIAL

## 2025-05-07 RX ORDER — LEVOTHYROXINE SODIUM 112 UG/1
112 TABLET ORAL
Qty: 90 TABLET | Refills: 3 | Status: SHIPPED | OUTPATIENT
Start: 2025-05-07

## 2025-05-12 ENCOUNTER — LAB VISIT (OUTPATIENT)
Dept: LAB | Facility: CLINIC | Age: 55
End: 2025-05-12
Payer: COMMERCIAL

## 2025-05-12 DIAGNOSIS — E03.9 ACQUIRED HYPOTHYROIDISM: ICD-10-CM

## 2025-05-12 DIAGNOSIS — F41.9 ANXIETY: ICD-10-CM

## 2025-05-12 DIAGNOSIS — E78.2 MIXED HYPERLIPIDEMIA: ICD-10-CM

## 2025-05-12 LAB
ABSOLUTE EOSINOPHIL (OHS): 0.04 K/UL
ABSOLUTE MONOCYTE (OHS): 0.33 K/UL (ref 0.3–1)
ABSOLUTE NEUTROPHIL COUNT (OHS): 3.55 K/UL (ref 1.8–7.7)
ALBUMIN SERPL BCP-MCNC: 4.1 G/DL (ref 3.5–5.2)
ALP SERPL-CCNC: 55 UNIT/L (ref 40–150)
ALT SERPL W/O P-5'-P-CCNC: 17 UNIT/L (ref 10–44)
ANION GAP (OHS): 10 MMOL/L (ref 8–16)
AST SERPL-CCNC: 29 UNIT/L (ref 11–45)
BASOPHILS # BLD AUTO: 0.06 K/UL
BASOPHILS NFR BLD AUTO: 1.1 %
BILIRUB SERPL-MCNC: 0.5 MG/DL (ref 0.1–1)
BUN SERPL-MCNC: 14 MG/DL (ref 6–20)
CALCIUM SERPL-MCNC: 9.4 MG/DL (ref 8.7–10.5)
CHLORIDE SERPL-SCNC: 108 MMOL/L (ref 95–110)
CHOLEST SERPL-MCNC: 171 MG/DL (ref 120–199)
CHOLEST/HDLC SERPL: 2.7 {RATIO} (ref 2–5)
CO2 SERPL-SCNC: 22 MMOL/L (ref 23–29)
CREAT SERPL-MCNC: 1 MG/DL (ref 0.5–1.4)
EAG (OHS): 100 MG/DL (ref 68–131)
ERYTHROCYTE [DISTWIDTH] IN BLOOD BY AUTOMATED COUNT: 12.4 % (ref 11.5–14.5)
GFR SERPLBLD CREATININE-BSD FMLA CKD-EPI: >60 ML/MIN/1.73/M2
GLUCOSE SERPL-MCNC: 121 MG/DL (ref 70–110)
HBA1C MFR BLD: 5.1 % (ref 4–5.6)
HCT VFR BLD AUTO: 39.5 % (ref 37–48.5)
HDLC SERPL-MCNC: 64 MG/DL (ref 40–75)
HDLC SERPL: 37.4 % (ref 20–50)
HGB BLD-MCNC: 13.1 GM/DL (ref 12–16)
IMM GRANULOCYTES # BLD AUTO: 0 K/UL (ref 0–0.04)
IMM GRANULOCYTES NFR BLD AUTO: 0 % (ref 0–0.5)
IRON SATN MFR SERPL: 26 % (ref 20–50)
IRON SERPL-MCNC: 109 UG/DL (ref 30–160)
LDLC SERPL CALC-MCNC: 94.2 MG/DL (ref 63–159)
LYMPHOCYTES # BLD AUTO: 1.73 K/UL (ref 1–4.8)
MCH RBC QN AUTO: 30.1 PG (ref 27–31)
MCHC RBC AUTO-ENTMCNC: 33.2 G/DL (ref 32–36)
MCV RBC AUTO: 91 FL (ref 82–98)
NONHDLC SERPL-MCNC: 107 MG/DL
NUCLEATED RBC (/100WBC) (OHS): 0 /100 WBC
PLATELET # BLD AUTO: 245 K/UL (ref 150–450)
PMV BLD AUTO: 12 FL (ref 9.2–12.9)
POTASSIUM SERPL-SCNC: 3.9 MMOL/L (ref 3.5–5.1)
PROT SERPL-MCNC: 6.8 GM/DL (ref 6–8.4)
RBC # BLD AUTO: 4.35 M/UL (ref 4–5.4)
RELATIVE EOSINOPHIL (OHS): 0.7 %
RELATIVE LYMPHOCYTE (OHS): 30.3 % (ref 18–48)
RELATIVE MONOCYTE (OHS): 5.8 % (ref 4–15)
RELATIVE NEUTROPHIL (OHS): 62.1 % (ref 38–73)
SODIUM SERPL-SCNC: 140 MMOL/L (ref 136–145)
T4 FREE SERPL-MCNC: 1.12 NG/DL (ref 0.71–1.51)
TIBC SERPL-MCNC: 422 UG/DL (ref 250–450)
TRANSFERRIN SERPL-MCNC: 285 MG/DL (ref 200–375)
TRIGL SERPL-MCNC: 64 MG/DL (ref 30–150)
TSH SERPL-ACNC: 3.79 UIU/ML (ref 0.4–4)
WBC # BLD AUTO: 5.71 K/UL (ref 3.9–12.7)

## 2025-05-12 PROCEDURE — 82306 VITAMIN D 25 HYDROXY: CPT

## 2025-05-12 PROCEDURE — 80061 LIPID PANEL: CPT

## 2025-05-12 PROCEDURE — 83036 HEMOGLOBIN GLYCOSYLATED A1C: CPT

## 2025-05-12 PROCEDURE — 85025 COMPLETE CBC W/AUTO DIFF WBC: CPT

## 2025-05-12 PROCEDURE — 84439 ASSAY OF FREE THYROXINE: CPT

## 2025-05-12 PROCEDURE — 84443 ASSAY THYROID STIM HORMONE: CPT

## 2025-05-12 PROCEDURE — 80053 COMPREHEN METABOLIC PANEL: CPT

## 2025-05-12 PROCEDURE — 86376 MICROSOMAL ANTIBODY EACH: CPT

## 2025-05-12 PROCEDURE — 84466 ASSAY OF TRANSFERRIN: CPT

## 2025-05-13 LAB
25(OH)D3+25(OH)D2 SERPL-MCNC: 43 NG/ML (ref 30–96)
THYROPEROXIDASE IGG SERPL-ACNC: <6 IU/ML

## 2025-06-03 ENCOUNTER — PATIENT OUTREACH (OUTPATIENT)
Dept: ADMINISTRATIVE | Facility: HOSPITAL | Age: 55
End: 2025-06-03
Payer: COMMERCIAL

## 2025-06-23 ENCOUNTER — OFFICE VISIT (OUTPATIENT)
Dept: FAMILY MEDICINE | Facility: CLINIC | Age: 55
End: 2025-06-23
Payer: COMMERCIAL

## 2025-06-23 VITALS
DIASTOLIC BLOOD PRESSURE: 73 MMHG | HEART RATE: 64 BPM | WEIGHT: 140.88 LBS | HEIGHT: 65 IN | SYSTOLIC BLOOD PRESSURE: 112 MMHG | BODY MASS INDEX: 23.47 KG/M2 | OXYGEN SATURATION: 99 %

## 2025-06-23 DIAGNOSIS — F41.9 ANXIETY: ICD-10-CM

## 2025-06-23 DIAGNOSIS — E03.9 ACQUIRED HYPOTHYROIDISM: ICD-10-CM

## 2025-06-23 DIAGNOSIS — E78.2 MIXED HYPERLIPIDEMIA: Primary | ICD-10-CM

## 2025-06-23 DIAGNOSIS — Z12.31 SCREENING MAMMOGRAM FOR BREAST CANCER: ICD-10-CM

## 2025-06-23 PROCEDURE — 3074F SYST BP LT 130 MM HG: CPT | Mod: CPTII,S$GLB,, | Performed by: FAMILY MEDICINE

## 2025-06-23 PROCEDURE — 3008F BODY MASS INDEX DOCD: CPT | Mod: CPTII,S$GLB,, | Performed by: FAMILY MEDICINE

## 2025-06-23 PROCEDURE — 1159F MED LIST DOCD IN RCRD: CPT | Mod: CPTII,S$GLB,, | Performed by: FAMILY MEDICINE

## 2025-06-23 PROCEDURE — 3044F HG A1C LEVEL LT 7.0%: CPT | Mod: CPTII,S$GLB,, | Performed by: FAMILY MEDICINE

## 2025-06-23 PROCEDURE — 3078F DIAST BP <80 MM HG: CPT | Mod: CPTII,S$GLB,, | Performed by: FAMILY MEDICINE

## 2025-06-23 PROCEDURE — G2211 COMPLEX E/M VISIT ADD ON: HCPCS | Mod: S$GLB,,, | Performed by: FAMILY MEDICINE

## 2025-06-23 PROCEDURE — 99214 OFFICE O/P EST MOD 30 MIN: CPT | Mod: S$GLB,,, | Performed by: FAMILY MEDICINE

## 2025-06-23 NOTE — PATIENT INSTRUCTIONS
Alternate between 100 and 112mcg dose of thyroid medication  Get mammogram    Lets recheck thyroid labs in 2 months or so

## 2025-06-23 NOTE — PROGRESS NOTES
"    [facility]  [facility] Clinics - [place], [place]    Family Medicine Office Visit    Chief Complaint   Patient presents with    Follow-up        HPI:  follow up.    Thyroid stable, but has historically had to waffle between 100 and 112mcg  Mood stable  Hyperlipidemia stable on appropriate intensity statin therapy        ROS: as above    Vitals:    06/23/25 1157   BP: 112/73   BP Location: Right arm   Patient Position: Sitting   Pulse: 64   SpO2: 99%   Weight: 63.9 kg (140 lb 14 oz)   Height: 5' 5" (1.651 m)      Body mass index is 23.44 kg/m².      General:  AOx3, well nourished and developed in no acute distress  Eyes:  PERRLA, EOMI, vision intact grossly  ENT:  normal hearing, moist oral mucosa  Neck:  trachea midline with no masses or thyromegaly  Heart:  RRR, no murmurs.  No edema noted, extremities warm and well perfused  Lungs:  clear to auscultation bilaterally with symmetric chest movement  Abdomen:  Soft, nontender, nondistended.  Normal bowel sounds  Musculoskeletal:  Normal gait.  Normal posture.  Normal muscular development with no joint swelling.  Neurological:  CN II-XII grossly intact. Symmetric strength and sensation  Psych:  Normal mood and affect.  Able to demonstrate good judgement and personal insight.      Assessment/Plan:    1. Mixed hyperlipidemia    2. Anxiety    3. Acquired hypothyroidism    4. Screening mammogram for breast cancer  -     Mammo Digital Screening Bilat w/ Eliseo (XPD); Future; Expected date: 06/23/2025         Stable  Stable  Ok to alternate between 100 and 112mcg to achieve moderate dose.  Check thyroid function in 6 weeks  Get mammogram    Visit today included increased complexity associated with the care of the episodic problem hld, anxiety, thyroid addressed and managing the longitudinal care of the patient due to the serious and/or complex managed problem(s) hld, anxiety, thyroid.      "

## 2025-07-07 ENCOUNTER — PATIENT MESSAGE (OUTPATIENT)
Dept: ADMINISTRATIVE | Facility: HOSPITAL | Age: 55
End: 2025-07-07
Payer: COMMERCIAL

## 2025-08-13 ENCOUNTER — PATIENT MESSAGE (OUTPATIENT)
Dept: ADMINISTRATIVE | Facility: HOSPITAL | Age: 55
End: 2025-08-13
Payer: COMMERCIAL

## 2025-08-27 PROCEDURE — 84439 ASSAY OF FREE THYROXINE: CPT | Performed by: FAMILY MEDICINE

## 2025-08-28 ENCOUNTER — RESULTS FOLLOW-UP (OUTPATIENT)
Dept: FAMILY MEDICINE | Facility: CLINIC | Age: 55
End: 2025-08-28
Payer: COMMERCIAL

## 2025-08-29 RX ORDER — LEVOTHYROXINE SODIUM 100 UG/1
100 TABLET ORAL EVERY OTHER DAY
Qty: 90 TABLET | Refills: 0 | Status: SHIPPED | OUTPATIENT
Start: 2025-08-29 | End: 2026-08-29